# Patient Record
Sex: FEMALE | Race: WHITE | NOT HISPANIC OR LATINO | Employment: STUDENT | URBAN - METROPOLITAN AREA
[De-identification: names, ages, dates, MRNs, and addresses within clinical notes are randomized per-mention and may not be internally consistent; named-entity substitution may affect disease eponyms.]

---
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
## 2020-12-14 ENCOUNTER — TELEPHONE (OUTPATIENT)
Dept: PSYCHIATRY | Facility: CLINIC | Age: 20
End: 2020-12-14

## 2020-12-14 NOTE — TELEPHONE ENCOUNTER
Dr. Dipti Guerra 12/7    [facility]    Phone Message    May a detailed message be left on voicemail: yes     Reason for Call: Other: Pt called to set up an appointment with Dr. Benitez. Pt stated that Dr. Dipti Guerra referred her and had already spoken with Dr. Benitez regarding the appointment. Writer did not see any indication on pt chart. What type of appt and when?     Writer can call and make appointment once confirmed.     Action Taken: Other: Sent to [facility]    Travel Screening: Not Applicable

## 2020-12-18 ENCOUNTER — TELEPHONE (OUTPATIENT)
Dept: PSYCHIATRY | Facility: CLINIC | Age: 20
End: 2020-12-18

## 2020-12-18 ENCOUNTER — TRANSFERRED RECORDS (OUTPATIENT)
Dept: HEALTH INFORMATION MANAGEMENT | Facility: CLINIC | Age: 20
End: 2020-12-18

## 2020-12-18 ENCOUNTER — VIRTUAL VISIT (OUTPATIENT)
Dept: PSYCHIATRY | Facility: CLINIC | Age: 20
End: 2020-12-18
Attending: PSYCHIATRY & NEUROLOGY
Payer: COMMERCIAL

## 2020-12-18 DIAGNOSIS — F34.0 CYCLOTHYMIC DISORDER: Primary | ICD-10-CM

## 2020-12-18 PROCEDURE — 90792 PSYCH DIAG EVAL W/MED SRVCS: CPT | Mod: GC | Performed by: STUDENT IN AN ORGANIZED HEALTH CARE EDUCATION/TRAINING PROGRAM

## 2020-12-18 RX ORDER — LAMOTRIGINE 25 MG/1
TABLET ORAL
Qty: 100 TABLET | Refills: 0 | Status: SHIPPED | OUTPATIENT
Start: 2020-12-18 | End: 2021-03-15 | Stop reason: DRUGHIGH

## 2020-12-18 ASSESSMENT — PAIN SCALES - GENERAL: PAINLEVEL: NO PAIN (0)

## 2020-12-18 NOTE — PATIENT INSTRUCTIONS
Please do not hesitate to call or message me with any questions or concerns.    Be well,  Dr. Benitez     INSTRUCTIONS FOR USE OF LAMICTAL  [lamotrigine]      DOSE INSTRUCTIONS:   Titration to a therapeutic dose ALWAYS STRICTLY follows this dosing plan:  - 25mg a day for 2 weeks  - 50mg a day for 2 weeks  - 100mg a day for 1 week (I will see you when on 100 mg before going to 200 mg)  - 200mg a day is the goal dose initially    CRITICAL ADDITIONAL DOSING INSTRUCTIONS:  - lamotrigine must be taken daily  - if you miss 2 or more days of medication, DO NOT restart at previous dose; you will be at risk for           developing a serious rash;  please call the clinic if you miss 2 or more days of medication      DO:  - call clinic if you, or another provider, makes any medication changes  - call clinic if your use of Ibuprofen (or similar) increases significantly  - call clinic if you experience any symptom listed below         FOOD: take with or without food       COMMON ADVERSE EFFECTS:  non-dangerous rash (7-14%), GI distress and blurred vision (up to 25%), dizziness (up to 54%),  headache (29%), ataxia, insomnia, somnolence, tremor, dizziness, low risk for anxiety and depression;  [please call the clinic or present to urgent care for any rash]      CALL CLINIC URGENTLY or EMERGENCY ROOM:  if you have any concerns, especially the following...  - unusual itching, dark urine, yellow skin/eyes, any skin changes  - thoughts of death or hurting yourself    - LAMICTAL RASH (Wilcox-Gilberto rash)-    You indicated that you understand use and risks of Lamictal.  This includes the fact that Lamictal must be     taken daily and cannot be restarted at previous dose if 2 or more days of medication are missed (see above).       If the following RASH occurs, STOP lamotrigine and go to the ED:    rash accompanied by fever or flu-like symptoms, and loss of appetite.    involves the face, mouth, tongue, nose, eyes ('above the neck')  and genital or anal areas.    more prominent in the neck and upper trunk.     merging rash that appear red and swollen (wheal or hives)    blistering     purplish, small spots that are tender to touch.    skin redness and swelling all over the body, with or without skin shedding      Thank you for coming to the [facility] [place] [facility].    Lab Testing:  If you had lab testing today and your results are reassuring or normal they will be mailed to you or sent through [employer] within 7 days. If the lab tests need quick action we will call you with the results. The phone number we will call with results is # [phone] (home) . If this is not the best number please call our clinic and change the number.    Medication Refills:  If you need any refills please call your pharmacy and they will contact us. Our fax number for refills is [phone]. Please allow three business for refill processing. If you need to  your refill at a new pharmacy, please contact the new pharmacy directly. The new pharmacy will help you get your medications transferred.     Scheduling:  If you have any concerns about today's visit or wish to schedule another appointment please call our office during normal business hours [phone] (8-5:00 M-F)    Contact Us:  Please call [phone] during business hours (8-5:00 M-F).  If after clinic hours, or on the weekend, please call  [phone].    Financial Assistance [phone]  [facility] Billing [phone]  Central Billing Office, ealth: [phone]  [facility] Billing [phone]  Medical Records [phone]  [facility] Patient Bill of Rights [email or web address]       MENTAL HEALTH CRISIS NUMBERS:  For a medical emergency please call  911 or go to the nearest ER.     [facility]:   [facility] -[phone]   Crisis Residence [facility]  -[phone]   Walk-[facility] Counseling Center [facility] -[phone]   COPE 24/7 [facility] Mobile Team -[phone] (adults)/[phone] (child)  CHILD: [facility] needs assessment team - [phone]      [facility]:   [facility] - [phone]   Walk-in counseling [facility] - [phone]   Walk-in counseling [facility] - [phone]   Crisis Residence [facility] - [phone]  Urgent Care Adult Mental K[facility]-[phone] mobile unit/ 24/7 crisis line    National Crisis Numbers:   National Suicide Prevention Lifeline: 1-911-574-TALK (013-756-6795)  Poison Control Center - 7-265-547-0188  [employer]/resources for a list of additional resources (SOS)  Trans Lifeline a hotline for transgender people 1-351.565.9752  The [employer] a hotline for LGBT youth 3-557-124-4435  Crisis Text Line: For any crisis 24/7   To: 746058  see www.crisistextline.org  - IF MAKING A CALL FEELS TOO HARD, send a text!         Again thank you for choosing [facility] MENTAL HEALTH & ADDICTION [facility] and please let us know how we can best partner with you to improve you and your family's health.    You may be receiving a survey regarding this appointment. We would love to have your feedback, both positive and negative. The survey is done by an external company, so your answers are anonymous.

## 2020-12-18 NOTE — PROGRESS NOTES
"VIDEO VISIT  Jerilyn Morales is a 20 year old patient who is being evaluated via a billable video visit.      The patient has been notified of following:   \"We have found that certain health care needs can be provided without the need for an in-person physical exam. This service lets us provide the care you need with a video conversation. If a prescription is necessary we can send it directly to your pharmacy. If lab work is needed we can place an order for that and you can then stop by our lab to have the test done at a later time. Insurers are generally covering virtual visits as they would in-office visits so billing should not be different than normal.  If for some reason you do get billed incorrectly, you should contact the billing office to correct it and that number is in the AVS .    Patient has given verbal consent for video visit?: Yes   How would you like to obtain your AVS?: [employer]  AVS SmartPhrase [PsychAVS] has been placed in 'Patient Instructions': Yes      Video- Visit Details  Type of service:  video visit for mental health treatment.  Time of service:    Date:  12/18/2020    Video Start Time:  10:12 AM        Video End Time: 11:30 AM    Reason for video visit: COVID-19  Originating Site (patient location):  Patient's home  Distant Site (provider location):  Remote location  Mode of Communication:  Video Conference via [facility]  Psychiatry Clinic  NEW PATIENT EVALUATION     Referred by care team. History was provided by the patient who was a good historian.    CARE TEAM:  PCP- Iliana Monge   Therapist- Dr. Dipti Britt         Jerilyn Morales is a 20 year old female who prefers the name Jerilyn & pronouns     CHIEF COMPLAINT                                                           \"Mood swings\"     HISTORY OF PRESENT ILLNESS   [4, 4]      Pertinent Background: Adolescent onset of mood swings with transient visual hallucinations determined to be " functional in etiology after extensive workup.     Psych critical item history includes: None   .   -The patient notes that she started having some changes in her mental health around the age of 16. She notes that she started having fast emotional changes. She noted that she started having visual hallucinations also. She notes that she had vivid hallucinations like a purple suit and herself sitting in a car. She notes that her worst one was seeing a shadow walking on all four. She notes that dropped to the ground and does not remember what happened for an hour. She woke up with all the lights on. She had insight into these. She notes that she had chunks of time where she would not remember things. She notes she saw multiple physicians. She saw a neurologist also. She had an EEG, sleep study, neuropsych workup, MRI. She saw Dr. Crystal at [facility]. There was a year of testing. She notes that she was determined to have low REM sleep contributing. She was diagnosed with a functional condition. She was tried on Prazosin to help with sleep. She ended up having biofeedback at the [facility] which helped. She saw one psychiatrist who recommended an antipsychotic but she did not take it. She notes things improved with time.   -She notes that she has done fairly well since then, attending college and being able to function.  -She notes that she started having mood swings at the end of her Sophomore year. She started seeing a therapist and this ended poorly due to her therapist being imprisoned. She was able to manage on her own.  -She notes worsening of her mental health over spring and fall. She notes further mood shifting and changes in eating habits. She would binge eat when she was depressed.   -She notes periods of increased energy, reduced need for sleep, increased goal directed activity, racing thoughts, racing speech, distractibility towards the end of the period, irritability and agitation at times,  increased self esteem, and elevated mood. These last at most two days. She notes sometimes feeling a little paranoid during the highs. She notes that the energy and productivity are the main point.  -She notes that her depressive periods only last a couple of days. These sometimes last a couple of hours. She notes decreased energy, increased appetite, depressed mood, poor concentration.  -She denies any suicide attempts. She has had some fleeting thoughts of death. Nothing right now.  -She has had periods of restricted or binge eating.  -She notes nausea associated with anxiety.  -She notes feeling chronically empty, difficulty with romantic relationships, unstable sense of self.   -She is very busy in school doing multiple roles.   -She notes distressing images that come into her head a times where her mood is elevated.   -Patient consents to starting Lamictal after discussing B/R/SE, including SJS.    RECENT PSYCH ROS:   Depression:  mood dysregulation  Elevated:  None  Psychosis:  None  Anxiety:  None  Trauma Related:  none  Dysregulation:  mood dysregulation and impulsive  Eating Disorder:None     Pertinent Negative Symptoms:  NO suicidal ideation, self-injurious behavior/urges, violent ideation, suicidal and violent ideation, aggression, psychosis, hallucinations, delusions, christelle, and hypomania       RECENT SUBSTANCE USE:     Alcohol- None  Tobacco- None  Caffeine- None  Opioids-  None       Narcan Kit- NA  Cannabis- None  Other illicit drugs- None    PSYCHIATRIC HISTORY                       *     SIB- None  Suicidal Ideation Hx- None  Suicide Attempt- # None- , most recent- None  Violence/Aggression Hx- None  Psychosis Hx- None  Eating Disorder Hx- None    Psych Hosp- # None-  most recent- NA  Commitment- None  ECT- None  Outpatient Programs - None    Past Psychotropic Med Trials- see next section    PAST MED TRIALS                                       *       Medication  Dose (mg) Effect  Dates of Use    Prazosin 2 Maybe helped 2017     SUBSTANCE USE HISTORY                   *     Past Use- None  Treatment- None, most recent- None  Medical Consequences- NA  HIV/Hepatitis- NA  Legal Consequences- NA    SOCIAL and FAMILY HISTORY      [1ea, 1ea]       patient reported               *      Financial Support- Full-time student  Family/ Children/ Relationships- Parents (Mom, , and Dad, entrepeneur), sister (18).   Living Situation- Lives with mom and sister  Cultural/ Social/ Spiritual Support- Spiritualist, no official regilion    Trauma History (self-report)- None  Legal- None  Early History/Education-  Born in [place] Moved to [place] when in elementary school. Father had nervous breakdown.  mom. [employer], [employer] and "[employer] Major.     FAMILY MENTAL HEALTH HX- Mom: None  Dad: Alcohol use disorder, ADHD, mood disorder; PG: Alcoholism, nood disorder, maybe BD; Sister: Anxiety, Depression    MEDICAL / SURGICAL HISTORY         Pregnant or breastfeeding- No    Contraception- IUD    There is no problem list on file for this patient.      No past surgical history on file.     MEDICAL REVIEW OF SYSTEMS    [2, 10]     Patient reports: None. Patient denies: HA, fever, chills, SOB    ALLERGY      Macadamia nut oil, Milk protein extract, and Walnuts [nuts]  MEDICATIONS          Current Outpatient Medications   Medication Sig Dispense Refill     diphenhydrAMINE (BENADRYL) 25 MG tablet Take 25 mg by mouth every 6 hours as needed for itching or allergies       EPINEPHrine (EPIPEN/ADRENACLICK/OR ANY BX GENERIC EQUIV) 0.3 MG/0.3ML injection 2-pack Inject 0.3 mLs (0.3 mg) into the muscle as needed for anaphylaxis 2 each 0     VITALS       [3, 3]   There were no vitals taken for this visit.   MENTAL STATUS EXAM     [9, 14 cog gs]     Alertness: alert and oriented  Appearance: appropriately groomed  Behavior/Demeanor: cooperative, pleasant and calm, with good  eye contact   Speech:  "normal and regular rate and rhythm  Language: intact and no problems  Psychomotor: normal or unremarkable  Mood: \"Fine right now.\"  Affect: full range; was congruent to mood; was congruent to content  Thought Process/Associations: circumstantial  Thought Content: Denies SI or HI.   Perception: Denies AVH  Insight: Good  Judgment: Good  Cognition: (6) does appear grossly intact; formal cognitive testing was not done  Gait and Station: unremarkable    LABS and DATA     AIMS:  NA    PHQ9 TODAY = NA  No flowsheet data found.    No lab results found.  No lab results found.    PSYCHOTROPIC DRUG INTERACTIONS     None    MANAGEMENT:  Monitoring for adverse effects and patient is aware of risks    RISK STATEMENT for SAFETY     Jerilyn Morales did not appear to be an imminent safety risk to self or others.    PSYCHIATRIC DIAGNOSES                                           1. Cyclothymic Disorder     ASSESSMENT    [m2, h3]     TODAY      Patient presents for her initial evaluation. She best meets criteria for cyclothymia at this point with medication being initiated per patient request after trying other therapy modalities including therapy. She is highly driven and has a type A personality structure with stress management being a core technique employed. Social rhythm therapy is a modality to consider with techniques taught during the session.     PLAN        [m2, h3]     1) PSYCHOTROPIC MEDICATIONS:  - Start Lamictal per standard titration (initial goal of 200 mg unless response prior, check at 100)    2) [place]  not checked today:  will be checked next visit.    3) THERAPY:  Continue    4) NEXT DUE:    Labs- None  EKG- None  Rating Scales- PHQ-9 every visit     5) REFERRALS:  None    6) RTC: 1 Month    7) Other recommendations:  A. Daily exercise  B. Health diet- consider Mediterranean   C. Stress management- consider Social Rhythm Therapy by Cara DURAN Mindfulness practices    7) CRISIS NUMBERS:   Provided routinely " in AVS    - [phone]  CRISIS TEXT LINE: Text [id number] from anywhere in [place], anytime, any crisis 24/7;  OR SEE www.crisistextline.org  ONLY if a [facility] PT: [facility] [place] [facility] [phone] (clinic), [phone] (after hours)     TREATMENT RISK STATEMENT:  The risks, benefits, alternatives and potential adverse effects have been discussed and are understood by the pt. The pt understands the risks of using street drugs or alcohol. There are no medical contraindications, the pt agrees to treatment with the ability to do so. The pt knows to call the clinic for any problems or to access emergency care if needed.  Medical and substance use concerns are documented above.  Psychotropic drug interaction check was done, including changes made today.    PROVIDER: Kyler Benitez, DO    Patient staffed in clinic with Raffi who will sign the note.  Supervisor is Dr. Nugent.

## 2020-12-18 NOTE — TELEPHONE ENCOUNTER
On 12/18/2020, 21 pages of records were received from [facility]. This writer sent the records to urgent scanning, which will appear in the media tab in 6 hours, this was also routed to Dr. Benitez.  Writer will confirm document in scanning in the coming days. Margo Burch, CMA

## 2021-01-03 ENCOUNTER — HEALTH MAINTENANCE LETTER (OUTPATIENT)
Age: 21
End: 2021-01-03

## 2021-01-29 ENCOUNTER — VIRTUAL VISIT (OUTPATIENT)
Dept: PSYCHIATRY | Facility: CLINIC | Age: 21
End: 2021-01-29
Attending: PSYCHIATRY & NEUROLOGY
Payer: COMMERCIAL

## 2021-01-29 ENCOUNTER — TELEPHONE (OUTPATIENT)
Dept: PSYCHIATRY | Facility: CLINIC | Age: 21
End: 2021-01-29

## 2021-01-29 DIAGNOSIS — F34.0 CYCLOTHYMIC DISORDER: Primary | ICD-10-CM

## 2021-01-29 PROCEDURE — 99214 OFFICE O/P EST MOD 30 MIN: CPT | Mod: HN | Performed by: STUDENT IN AN ORGANIZED HEALTH CARE EDUCATION/TRAINING PROGRAM

## 2021-01-29 NOTE — PROGRESS NOTES
"VIDEO VISIT  Jerilyn Morales is a 20 year old patient who is being evaluated via a billable video visit.      The patient has been notified of following:   \"We have found that certain health care needs can be provided without the need for an in-person physical exam. This service lets us provide the care you need with a video conversation. If a prescription is necessary we can send it directly to your pharmacy. If lab work is needed we can place an order for that and you can then stop by our lab to have the test done at a later time. Insurers are generally covering virtual visits as they would in-office visits so billing should not be different than normal.  If for some reason you do get billed incorrectly, you should contact the billing office to correct it and that number is in the AVS .    Patient has given verbal consent for video visit?: Yes   How would you like to obtain your AVS?: [employer]  AVS SmartPhrase [PsychAVS] has been placed in 'Patient Instructions': Yes      Video- Visit Details  Type of service:  video visit for mental health treatment.  Time of service:    Date:  01/29/2021    Video Start Time:  1:09 PM        Video End Time: 1:35 PM    Reason for video visit: COVID-19  Originating Site (patient location):  Patient's home  Distant Site (provider location):  Remote location  Mode of Communication:  Video Conference via [facility]  Psychiatry Clinic  PSYCHIATRIC PROGRESS NOTE       Referred by care team. History was provided by the patient who was a good historian.    CARE TEAM:  PCP- Iliana Monge   Therapist- Dr. Dipti Britt         Jerilyn Morales is a 20 year old female who prefers the name Jerilyn & pronouns     HISTORY OF PRESENT ILLNESS   [4, 4]      Pertinent Background: Adolescent onset of mood swings with transient visual hallucinations determined to be functional in etiology after extensive workup.     Psych critical item history includes: " "None   .   -The patient notes that she started having some changes in her mental health around the age of 16. She notes that she started having fast emotional changes. She noted that she started having visual hallucinations also. She notes that she had vivid hallucinations like a purple suit and herself sitting in a car. She notes that her worst one was seeing a shadow walking on all four. She notes that dropped to the ground and does not remember what happened for an hour. She woke up with all the lights on. She had insight into these. She notes that she had chunks of time where she would not remember things. She notes she saw multiple physicians. She saw a neurologist also. She had an EEG, sleep study, neuropsych workup, MRI. She saw Dr. Crystal at [facility]. There was a year of testing. She notes that she was determined to have low REM sleep contributing. She was diagnosed with a functional condition. She was tried on Prazosin to help with sleep. She ended up having biofeedback at the [facility] which helped. She saw one psychiatrist who recommended an antipsychotic but she did not take it. She notes things improved with time.   -She notes that she has done fairly well since then, attending college and being able to function.  -She notes that she started having mood swings at the end of her Sophomore year. She started seeing a therapist and this ended poorly due to her therapist being imprisoned. She was able to manage on her own.  -She notes worsening of her mental health over spring and fall. She notes further mood shifting and changes in eating habits. She would binge eat when she was depressed.   -The patient notes since starting Lamictal she has had mood stabilization. She notes that she has had no elevated mood states since starting. She notes that she feels like \"everything is coming together in her life.\" She notes more cognitive flexibility.  -She notes that she felt too \"groggy and heavy\" on 50 " mg of Lamictal. She notes some agitation also. She notes she felt this way before when she took a blood pressure medication for sleep.  -She notes that she would like to stay at her current dose of Lamictal.   -She notes that living at home has been helpful for mood regulation also.  -She denies any safety concerns.    RECENT PSYCH ROS:   Depression:  mood dysregulation  Elevated:  None  Psychosis:  None  Anxiety:  None  Trauma Related:  none  Dysregulation:  mood dysregulation and impulsive  Eating Disorder:None     Pertinent Negative Symptoms:  NO suicidal ideation, self-injurious behavior/urges, violent ideation, suicidal and violent ideation, aggression, psychosis, hallucinations, delusions, christelle, and hypomania       RECENT SUBSTANCE USE:     Alcohol- None  Tobacco- None  Caffeine- None  Opioids-  None       Narcan Kit- NA  Cannabis- None  Other illicit drugs- None    PSYCH and CD Critical Summary Points since July 2019 12/20: Started Lamictal 25 mg without further titration due to not tolerating 50 mg due to excess sedation and some feeligns of agitation. Marked improvement on 25 mg.     PAST MED TRIALS                                       *       Medication  Dose (mg) Effect  Dates of Use   Prazosin 2 Maybe helped 2017     MEDICAL / SURGICAL HISTORY         Pregnant or breastfeeding- No    Contraception- IUD    There is no problem list on file for this patient.      No past surgical history on file.     MEDICAL REVIEW OF SYSTEMS    [2, 10]     Patient reports: None. Patient denies: HA, fever, chills, SOB    ALLERGY      Macadamia nut oil, Milk protein extract, and Walnuts [nuts]  MEDICATIONS          Current Outpatient Medications   Medication Sig Dispense Refill     diphenhydrAMINE (BENADRYL) 25 MG tablet Take 25 mg by mouth every 6 hours as needed for itching or allergies       EPINEPHrine (EPIPEN/ADRENACLICK/OR ANY BX GENERIC EQUIV) 0.3 MG/0.3ML injection 2-pack Inject 0.3 mLs (0.3 mg) into the  "muscle as needed for anaphylaxis 2 each 0     lamoTRIgine (LAMICTAL) 25 MG tablet Take 1 tablet (25 mg) by mouth daily for 14 days, THEN 2 tablets (50 mg) daily for 14 days, THEN 4 tablets (100 mg) daily for 14 days. 100 tablet 0     VITALS       [3, 3]   There were no vitals taken for this visit.   MENTAL STATUS EXAM     [9, 14 cog gs]     Alertness: alert and oriented  Appearance: appropriately groomed  Behavior/Demeanor: cooperative, pleasant and calm, with good  eye contact   Speech: normal and regular rate and rhythm  Language: intact and no problems  Psychomotor: normal or unremarkable  Mood: \"Fine right now.\"  Affect: full range; was congruent to mood; was congruent to content  Thought Process/Associations: circumstantial  Thought Content: Denies SI or HI.   Perception: Denies AVH  Insight: Good  Judgment: Good  Cognition: (6) does appear grossly intact; formal cognitive testing was not done  Gait and Station: unremarkable    LABS and DATA     AIMS:  NA    PHQ9 TODAY = NA  No flowsheet data found.    No lab results found.  No lab results found.    PSYCHOTROPIC DRUG INTERACTIONS     None    MANAGEMENT:  Monitoring for adverse effects and patient is aware of risks    RISK STATEMENT for SAFETY     Jerilyn Morales did not appear to be an imminent safety risk to self or others.    PSYCHIATRIC DIAGNOSES                                           1. Cyclothymic Disorder     ASSESSMENT    [m2, h3]     TODAY      Patient presents for her initial evaluation. She best meets criteria for cyclothymia at this point with medication being initiated per patient request after trying other therapy modalities including therapy. She is highly driven and has a type A personality structure with stress management being a core technique employed. Social rhythm therapy is a modality to consider with techniques taught during the session.     PLAN        [m2, h3]     1) PSYCHOTROPIC MEDICATIONS:  - Start Lamictal per standard titration " (initial goal of 200 mg unless response prior, check at 100)    2) MN  not checked today:  will be checked next visit.    3) THERAPY:  Continue    4) NEXT DUE:    Labs- None  EKG- None  Rating Scales- PHQ-9 every visit     5) REFERRALS:  None    6) RTC: 1 Month    7) Other recommendations:  A. Daily exercise  B. Health diet- consider Mediterranean   C. Stress management- consider Social Rhythm Therapy by Cara DURAN Mindfulness practices    7) CRISIS NUMBERS:   Provided routinely in [facility] - [phone]  CRISIS TEXT LINE: Text [id number] from anywhere in [place], anytime, any crisis 24/7;  OR SEE www.crisistextline.org  ONLY if a [facility] PT: [facility] [phone] (clinic), [phone] (after hours)     TREATMENT RISK STATEMENT:  The risks, benefits, alternatives and potential adverse effects have been discussed and are understood by the pt. The pt understands the risks of using street drugs or alcohol. There are no medical contraindications, the pt agrees to treatment with the ability to do so. The pt knows to call the clinic for any problems or to access emergency care if needed.  Medical and substance use concerns are documented above.  Psychotropic drug interaction check was done, including changes made today.    PROVIDER: Kyler Benitez, DO    Patient not staffed in clinic.  Note will be reviewed and signed by supervisor Dr. Nugent.

## 2021-01-29 NOTE — TELEPHONE ENCOUNTER
On January 29, 2021, at 12:15 PM, writer called patient at [phone] to confirm Virtual Visit. Writer unable to make contact with patient. Writer left detailed voice message for call back. [phone] left as call back number. Francheska Best, [facility]

## 2021-03-15 ENCOUNTER — TELEPHONE (OUTPATIENT)
Dept: PSYCHIATRY | Facility: CLINIC | Age: 21
End: 2021-03-15

## 2021-03-15 ENCOUNTER — VIRTUAL VISIT (OUTPATIENT)
Dept: PSYCHIATRY | Facility: CLINIC | Age: 21
End: 2021-03-15
Attending: PSYCHIATRY & NEUROLOGY
Payer: COMMERCIAL

## 2021-03-15 DIAGNOSIS — F34.0 CYCLOTHYMIC DISORDER: Primary | ICD-10-CM

## 2021-03-15 PROCEDURE — 99214 OFFICE O/P EST MOD 30 MIN: CPT | Mod: U7 | Performed by: STUDENT IN AN ORGANIZED HEALTH CARE EDUCATION/TRAINING PROGRAM

## 2021-03-15 RX ORDER — LAMOTRIGINE 25 MG/1
25 TABLET ORAL DAILY
Qty: 90 TABLET | Refills: 0 | Status: SHIPPED | OUTPATIENT
Start: 2021-03-15 | End: 2021-05-17

## 2021-03-15 NOTE — TELEPHONE ENCOUNTER
On 3/15/2021, at 1:32, writer called patient at [phone] to confirm Virtual Visit. Writer unable to make contact with patient. Writer left detailed voice message for call back. [phone] left as call back number. Margo Burch, [facility]

## 2021-03-15 NOTE — PROGRESS NOTES
"VIDEO VISIT  Jerilyn Morales is a 20 year old patient who is being evaluated via a billable video visit.      The patient has been notified of following:   \"We have found that certain health care needs can be provided without the need for an in-person physical exam. This service lets us provide the care you need with a video conversation. If a prescription is necessary we can send it directly to your pharmacy. If lab work is needed we can place an order for that and you can then stop by our lab to have the test done at a later time. Insurers are generally covering virtual visits as they would in-office visits so billing should not be different than normal.  If for some reason you do get billed incorrectly, you should contact the billing office to correct it and that number is in the AVS .    Patient has given verbal consent for video visit?: Yes   How would you like to obtain your AVS?: [employer]  AVS SmartPhrase [PsychAVS] has been placed in 'Patient Instructions': Yes      Video- Visit Details  Type of service:  video visit for mental health treatment.  Time of service:    Date:  03/15/2021    Video Start Time:  1:58 PM        Video End Time: 215 PM    Reason for video visit: COVID-19  Originating Site (patient location):  Patient's home  Distant Site (provider location):  Remote location  Mode of Communication:  Video Conference via [facility]  Psychiatry Clinic  PSYCHIATRIC PROGRESS NOTE       Referred by care team. History was provided by the patient who was a good historian.    CARE TEAM:  PCP- Iliana Monge   Therapist- Dr. 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     Psych critical item history includes: " "None   .   -The patient notes that she started having some changes in her mental health around the age of 16. She notes that she started having fast emotional changes. She noted that she started having visual hallucinations also. She notes that she had vivid hallucinations like a purple suit and herself sitting in a car. She notes that her worst one was seeing a shadow walking on all four. She notes that dropped to the ground and does not remember what happened for an hour. She woke up with all the lights on. She had insight into these. She notes that she had chunks of time where she would not remember things. She notes she saw multiple physicians. She saw a neurologist also. She had an EEG, sleep study, neuropsych workup, MRI. She saw Dr. Crystal at [facility]. There was a year of testing. She notes that she was determined to have low REM sleep contributing. She was diagnosed with a functional condition. She was tried on Prazosin to help with sleep. She ended up having biofeedback at the [facility] which helped. She saw one psychiatrist who recommended an antipsychotic but she did not take it. She notes things improved with time. .  -She notes that she started having mood swings at the end of her Sophomore year. She started seeing a therapist and this ended poorly due to her therapist being imprisoned. She was able to manage on her own.  -She notes worsening of her mental health over spring and fall. She notes further mood shifting and changes in eating habits. She would binge eat when she was depressed.   -The patient notes since starting Lamictal she has had mood stabilization. She notes that she has had no elevated mood states since starting. She notes that she feels like \"everything is coming together in her life.\" She notes more cognitive flexibility.  -She notes that she felt too \"groggy and heavy\" on 50 mg of Lamictal. She notes some agitation also. She notes she felt this way before when she took a " blood pressure medication for sleep.  -She notes that she would like to stay at her current dose of Lamictal.   -She notes that living at home has been helpful for mood regulation also.  -She notes her sleep regulating more.  -She denies any safety concerns.    RECENT PSYCH ROS:   Depression:  mood dysregulation  Elevated:  None  Psychosis:  None  Anxiety:  None  Trauma Related:  none  Dysregulation:  mood dysregulation and impulsive  Eating Disorder:None     Pertinent Negative Symptoms:  NO suicidal ideation, self-injurious behavior/urges, violent ideation, suicidal and violent ideation, aggression, psychosis, hallucinations, delusions, christelle, and hypomania       RECENT SUBSTANCE USE:     Alcohol- None  Tobacco- None  Caffeine- None  Opioids-  None       Narcan Kit- NA  Cannabis- None  Other illicit drugs- None    PSYCH and CD Critical Summary Points since July 2019 12/20: Started Lamictal 25 mg without further titration due to not tolerating 50 mg due to excess sedation and some feeligns of agitation. Marked improvement on 25 mg.     PAST MED TRIALS                                       *       Medication  Dose (mg) Effect  Dates of Use   Prazosin 2 Maybe helped 2017     MEDICAL / SURGICAL HISTORY         Pregnant or breastfeeding- No    Contraception- IUD    There is no problem list on file for this patient.      No past surgical history on file.     MEDICAL REVIEW OF SYSTEMS    [2, 10]     Patient reports: None. Patient denies: HA, fever, chills, SOB    ALLERGY      Macadamia nut oil, Milk protein extract, and Walnuts [nuts]  MEDICATIONS          Current Outpatient Medications   Medication Sig Dispense Refill     diphenhydrAMINE (BENADRYL) 25 MG tablet Take 25 mg by mouth every 6 hours as needed for itching or allergies       EPINEPHrine (EPIPEN/ADRENACLICK/OR ANY BX GENERIC EQUIV) 0.3 MG/0.3ML injection 2-pack Inject 0.3 mLs (0.3 mg) into the muscle as needed for anaphylaxis 2 each 0     lamoTRIgine  "(LAMICTAL) 25 MG tablet Take 1 tablet (25 mg) by mouth daily for 14 days, THEN 2 tablets (50 mg) daily for 14 days, THEN 4 tablets (100 mg) daily for 14 days. 100 tablet 0     VITALS       [3, 3]   There were no vitals taken for this visit.   MENTAL STATUS EXAM     [9, 14 cog gs]     Alertness: alert and oriented  Appearance: appropriately groomed  Behavior/Demeanor: cooperative, pleasant and calm, with good  eye contact   Speech: normal and regular rate and rhythm  Language: intact and no problems  Psychomotor: normal or unremarkable  Mood: \"Great.\"  Affect: full range; was congruent to mood; was congruent to content  Thought Process/Associations: circumstantial  Thought Content: Denies SI or HI.   Perception: Denies AVH  Insight: Good  Judgment: Good  Cognition: (6) does appear grossly intact; formal cognitive testing was not done  Gait and Station: unremarkable    LABS and DATA     AIMS:  NA    PHQ9 TODAY = NA  No flowsheet data found.    No lab results found.  No lab results found.    PSYCHOTROPIC DRUG INTERACTIONS     None    MANAGEMENT:  Monitoring for adverse effects and patient is aware of risks    RISK STATEMENT for SAFETY     Jerilyn Morales did not appear to be an imminent safety risk to self or others.    PSYCHIATRIC DIAGNOSES                                           1. Cyclothymic Disorder     ASSESSMENT    [m2, h3]     TODAY      Patient presents for her initial evaluation. She best meets criteria for cyclothymia at this point with medication being initiated per patient request after trying other therapy modalities including therapy. She is highly driven and has a type A personality structure with stress management being a core technique employed. Social rhythm therapy is a modality to consider with techniques taught during the session.     PLAN        [m2, h3]     1) PSYCHOTROPIC MEDICATIONS:  - Lamictal 25 mg (significant reduction of symptoms at this dose, could not tolerate 50 mg)    2) MN  " not checked today:  will be checked next visit.    3) THERAPY:  Continue    4) NEXT DUE:    Labs- None  EKG- None  Rating Scales- PHQ-9 every visit     5) REFERRALS:  None    6) RTC: 2 Month    7) Other recommendations:  A. Daily exercise  B. Health diet- consider Mediterranean   C. Stress management- consider Social Rhythm Therapy by Cara DURAN Mindfulness practices    7) CRISIS NUMBERS:   Provided routinely in [facility] - [phone]  CRISIS TEXT LINE: Text [id number] from anywhere in [place], anytime, any crisis 24/7;  OR SEE www.crisistextline.org  ONLY if a [facility] PT: [facility] [phone] (clinic), [phone] (after hours)     TREATMENT RISK STATEMENT:  The risks, benefits, alternatives and potential adverse effects have been discussed and are understood by the pt. The pt understands the risks of using street drugs or alcohol. There are no medical contraindications, the pt agrees to treatment with the ability to do so. The pt knows to call the clinic for any problems or to access emergency care if needed.  Medical and substance use concerns are documented above.  Psychotropic drug interaction check was done, including changes made today.    PROVIDER: Kyler Benitez, DO    Patient not staffed in clinic.  Note will be reviewed and signed by supervisor Dr. Nugent.

## 2021-05-17 ENCOUNTER — TELEPHONE (OUTPATIENT)
Dept: PSYCHIATRY | Facility: CLINIC | Age: 21
End: 2021-05-17

## 2021-05-17 ENCOUNTER — VIRTUAL VISIT (OUTPATIENT)
Dept: PSYCHIATRY | Facility: CLINIC | Age: 21
End: 2021-05-17
Attending: RADIOLOGY
Payer: COMMERCIAL

## 2021-05-17 DIAGNOSIS — F34.0 CYCLOTHYMIC DISORDER: ICD-10-CM

## 2021-05-17 PROCEDURE — 99214 OFFICE O/P EST MOD 30 MIN: CPT | Mod: 95 | Performed by: STUDENT IN AN ORGANIZED HEALTH CARE EDUCATION/TRAINING PROGRAM

## 2021-05-17 RX ORDER — LAMOTRIGINE 25 MG/1
25 TABLET ORAL DAILY
Qty: 90 TABLET | Refills: 1 | Status: SHIPPED | OUTPATIENT
Start: 2021-05-17 | End: 2021-10-18

## 2021-05-17 NOTE — PROGRESS NOTES
"VIDEO VISIT  Jerilyn Morales is a 20 year old patient who is being evaluated via a billable video visit.      The patient has been notified of following:   \"We have found that certain health care needs can be provided without the need for an in-person physical exam. This service lets us provide the care you need with a video conversation. If a prescription is necessary we can send it directly to your pharmacy. If lab work is needed we can place an order for that and you can then stop by our lab to have the test done at a later time. Insurers are generally covering virtual visits as they would in-office visits so billing should not be different than normal.  If for some reason you do get billed incorrectly, you should contact the billing office to correct it and that number is in the AVS .    Patient has given verbal consent for video visit?: Yes   How would you like to obtain your AVS?: [employer]  AVS SmartPhrase [PsychAVS] has been placed in 'Patient Instructions': Yes      Video- Visit Details  Type of service:  video visit for mental health treatment.  Time of service:    Date:  05/17/2021    Video Start Time:  2:09 PM        Video End Time: 215 PM    Reason for video visit: COVID-19  Originating Site (patient location):  Patient's home  Distant Site (provider location):  Remote location  Mode of Communication:  Video Conference via [facility]  Psychiatry Clinic  PSYCHIATRIC PROGRESS NOTE       Referred by care team. History was provided by the patient who was a good historian.    CARE TEAM:  PCP- Iliana Monge   Therapist- Dr. 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     Psych critical item history includes: " "None   .   -The patient notes that she started having some changes in her mental health around the age of 16. She notes that she started having fast emotional changes. She noted that she started having visual hallucinations also. She notes that she had vivid hallucinations like a purple suit and herself sitting in a car. She notes that her worst one was seeing a shadow walking on all four. She notes that dropped to the ground and does not remember what happened for an hour. She woke up with all the lights on. She had insight into these. She notes that she had chunks of time where she would not remember things. She notes she saw multiple physicians. She saw a neurologist also. She had an EEG, sleep study, neuropsych workup, MRI. She saw Dr. Crystal at [facility]. There was a year of testing. She notes that she was determined to have low REM sleep contributing. She was diagnosed with a functional condition. She was tried on Prazosin to help with sleep. She ended up having biofeedback at [facility] which helped. She saw one psychiatrist who recommended an antipsychotic but she did not take it. She notes things improved with time. .  -She notes that she started having mood swings at the end of her Sophomore year. She started seeing a therapist and this ended poorly due to her therapist being imprisoned. She was able to manage on her own.  -She notes worsening of her mental health over spring and fall. She notes further mood shifting and changes in eating habits. She would binge eat when she was depressed.   -The patient notes since starting Lamictal she has had mood stabilization. She notes that she has had no elevated mood states since starting. She notes that she feels like \"everything is coming together in her life.\" She notes more cognitive flexibility.  -She notes that she felt too \"groggy and heavy\" on 50 mg of Lamictal. She notes some agitation also. She notes she felt this way before when she took a " blood pressure medication for sleep.  -She notes that she would like to stay at her current dose of Lamictal.   -She notes that living at home has been helpful for mood regulation also.  -She notes a little more significant mood swings due to stress but it is much better than prior.  -She notes her sleep regulating more.  -She denies any safety concerns.    RECENT PSYCH ROS:   Depression:  mood dysregulation  Elevated:  None  Psychosis:  None  Anxiety:  None  Trauma Related:  none  Dysregulation:  mood dysregulation and impulsive  Eating Disorder:None     Pertinent Negative Symptoms:  NO suicidal ideation, self-injurious behavior/urges, violent ideation, suicidal and violent ideation, aggression, psychosis, hallucinations, delusions, christelle, and hypomania       RECENT SUBSTANCE USE:     Alcohol- None  Tobacco- None  Caffeine- None  Opioids-  None       Narcan Kit- NA  Cannabis- None  Other illicit drugs- None    PSYCH and CD Critical Summary Points since July 2019 12/20: Started Lamictal 25 mg without further titration due to not tolerating 50 mg due to excess sedation and some feeligns of agitation. Marked improvement on 25 mg.     PAST MED TRIALS                                       *       Medication  Dose (mg) Effect  Dates of Use   Prazosin 2 Maybe helped 2017     MEDICAL / SURGICAL HISTORY         Pregnant or breastfeeding- No    Contraception- IUD    There is no problem list on file for this patient.      No past surgical history on file.     MEDICAL REVIEW OF SYSTEMS    [2, 10]     Patient reports: None. Patient denies: HA, fever, chills, SOB    ALLERGY      Macadamia nut oil, Milk protein extract, and Walnuts [nuts]  MEDICATIONS          Current Outpatient Medications   Medication Sig Dispense Refill     diphenhydrAMINE (BENADRYL) 25 MG tablet Take 25 mg by mouth every 6 hours as needed for itching or allergies       EPINEPHrine (EPIPEN/ADRENACLICK/OR ANY BX GENERIC EQUIV) 0.3 MG/0.3ML injection  "2-pack Inject 0.3 mLs (0.3 mg) into the muscle as needed for anaphylaxis 2 each 0     lamoTRIgine (LAMICTAL) 25 MG tablet Take 1 tablet (25 mg) by mouth daily 90 tablet 0     VITALS       [3, 3]   There were no vitals taken for this visit.   MENTAL STATUS EXAM     [9, 14 cog gs]     Alertness: alert and oriented  Appearance: appropriately groomed  Behavior/Demeanor: cooperative, pleasant and calm, with good  eye contact   Speech: normal and regular rate and rhythm  Language: intact and no problems  Psychomotor: normal or unremarkable  Mood: \"Great.\"  Affect: full range; was congruent to mood; was congruent to content  Thought Process/Associations: circumstantial  Thought Content: Denies SI or HI.   Perception: Denies AVH  Insight: Good  Judgment: Good  Cognition: (6) does appear grossly intact; formal cognitive testing was not done  Gait and Station: unremarkable    LABS and DATA     AIMS:  NA    PHQ9 TODAY = NA  No flowsheet data found.    No lab results found.  No lab results found.    PSYCHOTROPIC DRUG INTERACTIONS     None    MANAGEMENT:  Monitoring for adverse effects and patient is aware of risks    RISK STATEMENT for SAFETY     Jerilyn Morales did not appear to be an imminent safety risk to self or others.    PSYCHIATRIC DIAGNOSES                                           1. Cyclothymic Disorder     ASSESSMENT    [m2, h3]     TODAY      Patient meet criteria for cyclothymia at this point with medication being initiated per patient request after trying other modalities like therapy. She is highly driven and has a type A personality structure with stress management being a core technique employed. Social rhythm therapy is a modality to consider with techniques taught during multiple sessions. She has had a profound response to Lamictal at a low dose essentially being in remission. Will continue treatment and transfer to her PCP or [facility] depending on her preference. She likely will be stable on this " medication for significant time which PCP can manage. Will refer her to PCP given she does not have one.     PLAN        [m2, h3]     1) PSYCHOTROPIC MEDICATIONS:  - Lamictal 25 mg (significant reduction of symptoms at this dose, could not tolerate 50 mg)    2) [place]  not checked today:  will be checked next visit.    3) THERAPY:  Continue    4) NEXT DUE:    Labs- None  EKG- None  Rating Scales- PHQ-9 every visit     5) REFERRALS:  PCP provider    6) RTC: PRN    7) Other recommendations:  A. Daily exercise  B. Health diet- consider Mediterranean   C. Stress management- consider Social Rhythm Therapy by Cara DURAN Mindfulness practices    7) CRISIS NUMBERS:   Provided routinely in [facility] - [phone]  CRISIS TEXT LINE: Text [id number] from anywhere in [place], anytime, any crisis 24/7;  OR SEE www.crisistextline.org  ONLY if a [facility]: [facility] [place] [facility] [phone] (clinic), [phone] (after hours)     TREATMENT RISK STATEMENT:  The risks, benefits, alternatives and potential adverse effects have been discussed and are understood by the pt. The pt understands the risks of using street drugs or alcohol. There are no medical contraindications, the pt agrees to treatment with the ability to do so. The pt knows to call the clinic for any problems or to access emergency care if needed.  Medical and substance use concerns are documented above.  Psychotropic drug interaction check was done, including changes made today.    PROVIDER: Kyler Benitez, DO    Patient not staffed in clinic.  Note will be reviewed and signed by supervisor Dr. Nugent.

## 2021-05-17 NOTE — TELEPHONE ENCOUNTER
On 5/17/2021, at 1:35, writer called patient at [phone] to confirm Virtual Visit. Writer unable to make contact with patient. Writer left detailed voice message for call back. [phone] left as call back number. Margo Burch, [facility]

## 2021-10-10 ENCOUNTER — HEALTH MAINTENANCE LETTER (OUTPATIENT)
Age: 21
End: 2021-10-10

## 2021-10-18 ENCOUNTER — VIRTUAL VISIT (OUTPATIENT)
Dept: PSYCHIATRY | Facility: CLINIC | Age: 21
End: 2021-10-18
Attending: PSYCHIATRY & NEUROLOGY
Payer: COMMERCIAL

## 2021-10-18 DIAGNOSIS — F34.0 CYCLOTHYMIC DISORDER: ICD-10-CM

## 2021-10-18 PROCEDURE — 99215 OFFICE O/P EST HI 40 MIN: CPT | Mod: 95 | Performed by: STUDENT IN AN ORGANIZED HEALTH CARE EDUCATION/TRAINING PROGRAM

## 2021-10-18 RX ORDER — LAMOTRIGINE 25 MG/1
25 TABLET ORAL DAILY
Qty: 90 TABLET | Refills: 0 | Status: SHIPPED | OUTPATIENT
Start: 2021-10-18

## 2021-10-18 ASSESSMENT — PAIN SCALES - GENERAL: PAINLEVEL: NO PAIN (0)

## 2021-10-18 NOTE — PATIENT INSTRUCTIONS
**For crisis resources, please see the information at the end of this document**     Patient Education      Thank you for coming to the [facility] [place] CLINIC.    Lab Testing:  If you had lab testing today and your results are reassuring or normal they will be mailed to you or sent through [employer] within 7 days. If the lab tests need quick action we will call you with the results. The phone number we will call with results is # [phone] (home) . If this is not the best number please call our clinic and change the number.    Medication Refills:  If you need any refills please call your pharmacy and they will contact us. Our fax number for refills is [phone]. Please allow three business for refill processing. If you need to  your refill at a new pharmacy, please contact the new pharmacy directly. The new pharmacy will help you get your medications transferred.     Scheduling:  If you have any concerns about today's visit or wish to schedule another appointment please call our office during normal business hours [phone] (8-5:00 M-F)    Contact Us:  Please call [phone] during business hours (8-5:00 M-F).  If after clinic hours, or on the weekend, please call  [phone].    Financial Assistance [phone]  [facility] Billing [phone]  Central Billing Office, [facility]: [phone]  [facility] Billing [phone]  Medical Records [phone]  [facility] Patient Bill of Rights [email or web address]       MENTAL HEALTH CRISIS NUMBERS:  For a medical emergency please call  911 or go to the nearest ER.     [facility]:   [facility] -[phone]   Crisis Residence [facility] -[phone]   Walk-In Counseling Center [facility] [place] -[phone]   COPE 24/7 [place] Mobile Team -[phone] (adults)/[phone] (child)  CHILD: [facility] needs assessment  team - [phone]      [facility]:   [facility] - [phone]   Walk-in counseling [facility] - [phone]   Walk-in counseling [facility] - [phone]   Crisis Residence [facility] - [phone]  Urgent Care Adult Mental [facility]-[phone] mobile unit/ 24/7 crisis line    National Crisis Numbers:   National Suicide Prevention Lifeline: 3-266-914-TALK (755-189-0247)  Poison Control Center - 3-369-931-1504  [employer]/resources for a list of additional resources (SOS)  Trans Lifeline a hotline for transgender people 1-242.640.9982  The [employer] Project a hotline for LGBT youth 4-126-258-0978  Crisis Text Line: For any crisis 24/7   To: 085674  see www.crisistextline.org  - IF MAKING A CALL FEELS TOO HARD, send a text!         Again thank you for choosing [facility] and please let us know how we can best partner with you to improve you and your family's health.    You may be receiving a survey regarding this appointment. We would love to have your feedback, both positive and negative. The survey is done by an external company, so your answers are anonymous.

## 2021-10-18 NOTE — PROGRESS NOTES
"VIDEO VISIT  Jerilyn Morales is a 21 year old patient that has consented to receive services via billable video visit.      The patient has been notified of following:   \"This video visit will be conducted via a call between you and your physician/provider. We have found that certain health care needs can be provided without the need for an in-person physical exam. This service lets us provide the care you need with a video conversation. If a prescription is necessary we can send it directly to your pharmacy. If lab work is needed we can place an order for that and you can then stop by our lab to have the test done at a later time. Insurers are generally covering virtual visits as they would in-office visits so billing should not be different than normal.  If for some reason you do get billed incorrectly, you should contact the billing office to correct it and that number is in the AVS .    Patient will join video visit via:  text invite was sent ([employer] is preferred, but patient is unable to join via PictureMenu)    If patient attempts to join the video via [employer] at appointment start time, but is unable to, they would prefer that the provider send them a video invitation via:   Text to preferred phone: [phone]      How would patient like to obtain AVS?:  MyChart    "

## 2021-10-18 NOTE — PROGRESS NOTES
Video- Visit Details  Type of service:  video visit for diagnostic assessment  Time of service:    Date:  10/15/2021    Video Start Time:  1:30 PM  Video End Time:  2:30 PM    Reason for video visit:  Patient unable to travel due to Covid-19  Originating Site (patient location):  [facility]   Location- Patient's home  Distant Site (provider location):  [facility]  Mode of Communication:  Video Conference via [employer]  Consent:  Patient has given verbal consent for video visit?: Yes         [facility]  Psychiatry Clinic  TRANSFER of CARE DIAGNOSTIC ASSESSMENT     CARE TEAM:  PCP- Iliana Monge MD, Psychotherapist- None,  Jerilyn Morales is a 21 year old who uses the name Jerilyn and pronouns she, her, hers.      DIAGNOSIS     Cyclothymic Disorder   - r/o Bipolar II Disorder     ASSESSMENT     Today, Jerilyn reported that her mood dysregulation symptoms are stable. Several of the stressors that made these symptoms worse last year have resolved which she has identified as a helpful factor. She has found Lamictal 25mg helpful for these symptoms and in the past was unable to tolerate a higher dose. She has a history of going several days without her medication and we discussed that while at her current dose her risk of serious side effects would be low however inconsistent use could precipitate a rash or potentially SJS. She indicated understanding of this and agreed to inform our office of any side effects.    She did identify that her symptoms of elevation were more frequent/bothersome and we discussed alternative medications that could be more helpful for prevention, e.g. Lithium. We also discussed the possibility of using an as needed medication in the future for sleep when she was in an elevated mood state. She did not wish to make medication changes today but we will continue to consider in the future.    [place]:  Indicates not use of controlled  "substances in several months.     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 eating an entire box of pasta)   - lasted for <4 days   - friends notice that she speaks fast, goes on tangents when she doesn't take her medication for several days     - friend and Dad have been helpful to let her know when she is not doing well   - does not have risk taking behaviors, no clouding of judgement   - would \"crash\" after these periods   - feels that cyclothymia does describe her symptoms well, has a friend with Bipolar II and her friend's symptoms feel \"more extreme\"  - more symptoms of elevation in the fall    - does have depressive periods, last fall was the worst its been in recent memroy   - when depression is " "bad she notices she is staying in bed more, not going to class   - strong sense of doom   - no mood symptoms since early summer  - passive thoughts about wanting to go to sleep and not wake up, wanted to \"stop existing\"   - has never had thoughts of wanting to die, has never had plans or intent to harm self    - has anxiety prior to falling asleep, difficulty falling asleep especially when feeling more elevated    - reports in high school she had \"transient visual halluncinations\" but \"never full blown psychosis\"    - Neurology thought 2/2 to poor REM sleep   - these symptoms improved and have not recurred in recent years    - Lamictal has leveled out mood, no more \"energy spikes\"   - Lamictal seems to work well overall   - has tried a higher dose, felt flat at 50mg    - last year was in therapy, not currently working with anyone    RECENT PSYCH ROS:   Depression:  mood dysregulation  Elevated:  increased energy, decreased sleep need, increased activity and grandiosity  Psychosis:  none  Anxiety:   nervous before falling asleep  Trauma Related:  none  Sleep:  see HPI  Other: no    Adverse Effects: none reported  Pertinent Negative Symptoms: No suicidal ideation or psychosis  Recent Substance Use:     - drinks alcohol infrequently      FAMILY and SOCIAL HISTORY                                 pt reported     Family Hx:   Mom: None  Dad: Alcohol use disorder, ADHD, mood disorder, was on lithium for awhile for \"manic depression\"; PG: Alcoholism, nood disorder, maybe BD; Sister: Anxiety, Depression    Social Hx:  Financial Support- Full-time student  Family/ Children/ Relationships- Parents (Mom, , and Dad, entrepeneur), sister (18).   Living Situation- Lives with mom and sister  Cultural/ Social/ Spiritual Support- Spiritualist, no official regilion, has several close friends     Trauma History (self-report)- None  Legal- None  Early History/Education-  Born in [place] Moved to [place] when in " "elementary school. Father had nervous breakdown.  mom. 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 Wants to go into [employer].    12/20: Started Lamictal 25 mg without further titration due to not tolerating 50 mg due to excess sedation and some feeligns of agitation. Marked improvement on 25 mg.       PSYCHIATRIC HISTORY     SIB- None  Suicidal Ideation Hx- passive thoughts about wanting to go to sleep and not wake up, wanted to \"stop existing\"  Suicide Attempt- # None- , most recent- None  Violence/Aggression Hx- None  Psychosis Hx- None  Eating Disorder Hx- None     Psych Hosp- # None-  most recent- NA  Commitment- None  ECT- None  Outpatient Programs - None     PAST MED TRIALS     Medication  Dose (mg) Effect  Dates of Use   Prazosin 2 Maybe helped, used to increase REM sleep by Neurologist 2017        SUBSTANCE USE HISTORY     Past Use- None  Treatment- None, most recent- None  Medical Consequences- NA  HIV/Hepatitis- NA  Legal Consequences- NA     MEDICAL HISTORY and ALLERGY     ALLERGIES: Dairy products [milk protein extract], Macadamia nut oil, and Walnuts [nuts]     MEDICAL REVIEW OF SYSTEMS   Contraception- not discussed today, will revisit at next session    A comprehensive review of systems was performed and is negative other than noted in the HPI.     MEDICATIONS     Current Outpatient Medications   Medication Sig Dispense Refill     diphenhydrAMINE (BENADRYL) 25 MG tablet Take 25 mg by mouth every 6 hours as needed for itching or allergies       EPINEPHrine (EPIPEN/ADRENACLICK/OR ANY BX GENERIC EQUIV) 0.3 MG/0.3ML injection 2-pack Inject 0.3 mLs (0.3 mg) into the muscle as needed for anaphylaxis 2 each 0     lamoTRIgine (LAMICTAL) 25 MG tablet Take 1 tablet (25 mg) by mouth daily 90 tablet 1      VITALS   There were no vitals taken for this visit.    MENTAL STATUS EXAM     Alertness: alert   Appearance: well groomed  Behavior/Demeanor: cooperative, with good  eye contact "   Speech: regular rate and rhythm  Language: intact  Psychomotor: normal or unremarkable  Mood: description consistent with euthymia  Affect: full range; congruent to: mood- yes, content- yes  Thought Process/Associations: unremarkable  Thought Content:  Reports none;  Denies suicidal ideation  Perception:  Reports none;  Denies hallucinations  Insight: excellent  Judgment: excellent  Cognition: does  appear grossly intact; formal cognitive testing was not done  Gait and Station: N/A (telehealth)     LABS and DATA     PHQ9 TODAY = not collected    No recent labs or EKG on file     PSYCHOTROPIC DRUG INTERACTIONS     None    MANAGEMENT:  N/A     RISK STATEMENT for SAFETY     Jerilyn Morales did not appear to be an imminent safety risk to self or others.    TREATMENT RISK STATEMENT: The risks, benefits, alternatives and potential adverse effects have been discussed and are understood by the pt. The pt understands the risks of using street drugs or alcohol. There are no medical contraindications, the pt agrees to treatment with the ability to do so. The pt knows to call the clinic for any problems or to access emergency care if needed.  Medical and substance use concerns are documented above.  Psychotropic drug interaction check was done, including changes made today.     PROVIDER: Jeanna Gutierrez MD    Patient not staffed in clinic.  Note will be reviewed and signed by supervisor Dr. Bagley.      Attestation:  I did not see Jerilyn Morales directly. I have reviewed the documentation and I agree with the resident's plan of care.   Curry Bagley MD

## 2021-12-03 ENCOUNTER — TELEPHONE (OUTPATIENT)
Dept: PSYCHIATRY | Facility: CLINIC | Age: 21
End: 2021-12-03
Payer: COMMERCIAL

## 2021-12-03 NOTE — TELEPHONE ENCOUNTER
On December 3, 2021, at 7:37 AM, writer called patient at mobile to confirm Virtual Visit. Writer unable to make contact with patient. Writer left detailed voice message for call back. [phone] left as call back number. A link to the video visit was sent to the patient's email address and mobile phone number. Jorge Mcelroy, EMT

## 2022-01-30 ENCOUNTER — HEALTH MAINTENANCE LETTER (OUTPATIENT)
Age: 22
End: 2022-01-30

## 2022-09-18 ENCOUNTER — HEALTH MAINTENANCE LETTER (OUTPATIENT)
Age: 22
End: 2022-09-18

## 2023-05-07 ENCOUNTER — HEALTH MAINTENANCE LETTER (OUTPATIENT)
Age: 23
End: 2023-05-07

## 2024-07-14 ENCOUNTER — HEALTH MAINTENANCE LETTER (OUTPATIENT)
Age: 24
End: 2024-07-14